# Patient Record
(demographics unavailable — no encounter records)

---

## 2024-11-22 NOTE — PLAN
[FreeTextEntry1] : 36-year-old female for well woman exam  1.  Pap done 2.  Self breast exam reviewed 3.  Family history of ovarian cancer.  Patient counseled on genetic testing.  All risks and benefits were reviewed with the patient.  She plans to return to the office for genetic testing. 4.  Patient plans to use condoms for contraception.  She will return to the office to discuss other options if she so desires. 5.  Annual exam in 1 year
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
## 2024-11-22 NOTE — PHYSICAL EXAM
[Chaperone Present] : A chaperone was present in the examining room during all aspects of the physical examination [[id number]] : A chaperone was present during the pelvic exam. [FreeTextEntry2] : HILLARY Staples [Appropriately responsive] : appropriately responsive [Alert] : alert [No Acute Distress] : no acute distress [No Lymphadenopathy] : no lymphadenopathy [Regular Rate Rhythm] : regular rate rhythm [No Murmurs] : no murmurs [Clear to Auscultation B/L] : clear to auscultation bilaterally [Soft] : soft [Non-tender] : non-tender [Non-distended] : non-distended [No HSM] : No HSM [No Lesions] : no lesions [No Mass] : no mass [Oriented x3] : oriented x3 [Examination Of The Breasts] : a normal appearance [No Masses] : no breast masses were palpable [Labia Majora] : normal [Labia Minora] : normal [Normal] : normal [Uterine Adnexae] : normal

## 2025-01-18 NOTE — PLAN
[FreeTextEntry1] : Colposcopic, ECC, and cervical biopsies performed as described above.  She was given opportunity ask questions both prior to and postprocedure and all questions were addressed.  She tolerated procedure well.  She was given call, follow-up, ER precautions regarding signs and symptoms of infection and abnormal bleeding.  Pathogenesis of HPV was reviewed prior to the procedure.  Maintaining a healthy immune system and lifestyle discussed along with [facility] daily supplementation for 6 months time.

## 2025-01-18 NOTE — HISTORY OF PRESENT ILLNESS
[FreeTextEntry1] : 36-year-old female -0-2-1 presenting for a colposcopy, ECC, possible cervical biopsy secondary to an abnormal cervical Pap smear performed on 2024 resulting ASCUS cannot rule out high-grade with positive high risk HPV.  She is understandably nervous regarding the procedure.  Her in office urine pregnancy test is negative.  Obstetric history of 1 prior  section at 39 weeks and 4 days 2023.  Denies gynecologic history of uterine fibroids, ovarian cysts.  She reports prior abnormal cervical Pap smears the last being 2024 resulting ASCUS cannot rule out high-grade with positive high-risk HPV.

## 2025-01-18 NOTE — PHYSICAL EXAM
[Appropriately responsive] : appropriately responsive [Alert] : alert [No Acute Distress] : no acute distress [Soft] : soft [Non-tender] : non-tender [Non-distended] : non-distended [No HSM] : No HSM [No Lesions] : no lesions [No Mass] : no mass [Oriented x3] : oriented x3 [Labia Majora] : normal [Labia Minora] : normal [Normal] : normal [FreeTextEntry5] : Colposcopy performed as described

## 2025-03-05 NOTE — PLAN
[FreeTextEntry1] : She is doing well postoperatively status post LEEP procedure with ECC.  Pathology results from 2/17/2025 reviewed with her in detail and are significant for JONATHAN 3 with surgical margins being negative for dysplasia.  ECC also negative.  She has no postoperative concerns at this time.  Recommendation for repeat cervical Pap smear in approximately 6 months time discussed at length.  She was given opportunity ask questions all questions were addressed.  She understands the plan of care.

## 2025-03-05 NOTE — HISTORY OF PRESENT ILLNESS
[FreeTextEntry1] : 36-year-old female -0-2-1 presenting office for her postoperative appointment status post LEEP procedure with ECC on 2025 at [facility] secondary to a diagnosis of JONATHAN-2/JONATHAN-3 with negative ECC on colposcopy.  She is overall doing well postoperatively and reports no abnormal discharge or bleeding.  She was contacted regarding her pathology results which will once again be reviewed today.  She is overall satisfied with the results of her surgical procedure.  Obstetric history of 1 prior  section at 39 weeks and 4 days 2023.  Denies gynecologic history of uterine fibroids, ovarian cysts.  She reports prior abnormal cervical Pap smears the last being 2024 resulting ASCUS cannot rule out high-grade with positive high-risk HPV with subsequent colposcopy regarding JONATHAN-2/JONATHAN-3 with negative ECC followed by a LEEP procedure on 2025 resulting in negative margins and negative ECC.

## 2025-03-05 NOTE — COUNSELING
[Confidentiality] : confidentiality [Lab Results] : lab results [Medication Management] : medication management [Pre/Post Op Instructions] : pre/post op instructions [Other ___] : [unfilled]